# Patient Record
Sex: MALE | Race: WHITE | URBAN - METROPOLITAN AREA
[De-identification: names, ages, dates, MRNs, and addresses within clinical notes are randomized per-mention and may not be internally consistent; named-entity substitution may affect disease eponyms.]

---

## 2024-09-03 ENCOUNTER — OFFICE VISIT (OUTPATIENT)
Dept: URBAN - METROPOLITAN AREA CLINIC 80 | Facility: CLINIC | Age: 34
End: 2024-09-03

## 2024-09-11 ENCOUNTER — LAB OUTSIDE AN ENCOUNTER (OUTPATIENT)
Dept: URBAN - METROPOLITAN AREA CLINIC 80 | Facility: CLINIC | Age: 34
End: 2024-09-11

## 2024-09-11 ENCOUNTER — DASHBOARD ENCOUNTERS (OUTPATIENT)
Age: 34
End: 2024-09-11

## 2024-09-11 ENCOUNTER — OFFICE VISIT (OUTPATIENT)
Dept: URBAN - METROPOLITAN AREA CLINIC 80 | Facility: CLINIC | Age: 34
End: 2024-09-11

## 2024-09-11 ENCOUNTER — OFFICE VISIT (OUTPATIENT)
Dept: URBAN - METROPOLITAN AREA CLINIC 80 | Facility: CLINIC | Age: 34
End: 2024-09-11
Payer: SELF-PAY

## 2024-09-11 VITALS
BODY MASS INDEX: 33.75 KG/M2 | WEIGHT: 210 LBS | TEMPERATURE: 97.4 F | SYSTOLIC BLOOD PRESSURE: 134 MMHG | HEART RATE: 90 BPM | HEIGHT: 66 IN | DIASTOLIC BLOOD PRESSURE: 82 MMHG

## 2024-09-11 DIAGNOSIS — K92.1 BLOOD IN STOOL: ICD-10-CM

## 2024-09-11 DIAGNOSIS — K64.8 INTERNAL HEMORRHOID: ICD-10-CM

## 2024-09-11 DIAGNOSIS — R10.84 ABDOMINAL CRAMPING, GENERALIZED: ICD-10-CM

## 2024-09-11 DIAGNOSIS — R11.0 AM NAUSEA: ICD-10-CM

## 2024-09-11 PROBLEM — 428283002: Status: ACTIVE | Noted: 2024-09-11

## 2024-09-11 PROCEDURE — 99204 OFFICE O/P NEW MOD 45 MIN: CPT | Performed by: INTERNAL MEDICINE

## 2024-09-11 RX ORDER — ONDANSETRON 4 MG/1
1 TABLET ON THE TONGUE AND ALLOW TO DISSOLVE TABLET, ORALLY DISINTEGRATING ORAL
Qty: 30 TABLET | Refills: 0 | OUTPATIENT
Start: 2024-09-11

## 2024-09-11 RX ORDER — ESOMEPRAZOLE MAGNESIUM 40 MG/1
1 CAPSULE CAPSULE, DELAYED RELEASE PELLETS ORAL ONCE A DAY
Status: ACTIVE | COMMUNITY

## 2024-09-11 RX ORDER — AMLODIPINE BESYLATE 5 MG/1
1 TABLET TABLET ORAL TWICE A DAY
Status: ACTIVE | COMMUNITY

## 2024-09-11 RX ORDER — RIFAXIMIN 550 MG/1
1 TABLET TABLET ORAL THREE TIMES A DAY
Qty: 42 TABLET | Refills: 0 | OUTPATIENT
Start: 2024-09-11 | End: 2024-09-25

## 2024-09-11 RX ORDER — HYDROCORTISONE ACETATE 25 MG/1
1 SUPPOSITORY SUPPOSITORY RECTAL ONCE A DAY
Qty: 14 SUPPOSITORY | Refills: 0 | OUTPATIENT
Start: 2024-09-11 | End: 2024-09-25

## 2024-09-11 RX ORDER — HYDROXYZINE HYDROCHLORIDE 25 MG/1
1 TABLET AS NEEDED TABLET, FILM COATED ORAL ONCE A DAY
Status: ACTIVE | COMMUNITY

## 2024-09-11 NOTE — HPI-TODAY'S VISIT:
Patient with a PMHx of HTN and heartburn comes in c/o feeling like his "food is not processing". He notes that he has had digestive issues for about 3 years that has been worsening. Prior to 3 years ago, pt would have a BM a day. Now patient notes he is having a BM 3-4 times per day and bss 6. He has associated bloating all day/every day.   Patient notes that he does have pain in the LUQ and LLQ intermittently that started about 3 years ago. This pain achy and episodes last about 2 days. Last episode was a week ago. Nothing makes it better. Palpation makes this pain worse. Not affected by BM or eating.   Patient notes RUQ x 6 weeks. This pain is intermittent, use to happen once a day, now happening 3-4 times per day. Episodes last about 20 min. Pain is sharp. No history of abdominal surgery. Nothing makes it better or worse. Patient notes the last episode of this sharp pain was a week ago. The pain will radiate up into the mid chest area and even into his neck.   denies fever/chills.   Denies any family history of Crohns or UC. Mother did need to have GB removed.   Denies any family history of GI related cancer.   Pt notes he did have a colonoscopy when he was 16 for rectal bleeding and told he had polyps and internal hemorrhoids. Patient is unsure of when he was told to repeat.   Pt states he has had bleeding only on the tissue, not on in toilet most days x 3 years.   Patient notes that he does have nausea daily x years. denies vomiting. Heartburn is well controlled on esomeprazole.   denies dysphagia. denies melena. weight has been stable.   Patient notes he recently moved to [place] 1 year ago. He went to the hospital for chest pain and SOB in [facility] but they told him that they thought his symptoms were stomach related. Pt did not see a GI specialist in [facility]. Patient notes that he has been seen by a PCP for past episodes of CP and SOB but he states that no one has worked up his heart, and that he was put on anxiety medicine for these symptoms, but he does not take this medication as it makes him tired. pt denies any current symptoms of chest pain or SOB. Denies any family history of cardiac related issues. He states he smoked years ago but stopped when he was still a teenager.     Patient denies blood thinner use, pacemaker/defibrillator, diabetes, kidney disease, and home O2. Denies any heart or lung issues.

## 2024-09-11 NOTE — PHYSICAL EXAM GASTROINTESTINAL
Abdomen , soft, nontender, nondistended , no guarding or rigidity , no masses palpable , normal bowel sounds, negative Mcgraw's sign, negative Rovsing's, negative psoas and obturator signs, negative CVA tenderness bilaterally Liver and Spleen , no hepatosplenomegaly Rectal , deferred

## 2024-09-13 ENCOUNTER — TELEPHONE ENCOUNTER (OUTPATIENT)
Dept: URBAN - METROPOLITAN AREA CLINIC 80 | Facility: CLINIC | Age: 34
End: 2024-09-13

## 2024-09-17 ENCOUNTER — TELEPHONE ENCOUNTER (OUTPATIENT)
Dept: URBAN - METROPOLITAN AREA CLINIC 80 | Facility: CLINIC | Age: 34
End: 2024-09-17

## 2024-09-23 ENCOUNTER — LAB OUTSIDE AN ENCOUNTER (OUTPATIENT)
Dept: URBAN - METROPOLITAN AREA CLINIC 80 | Facility: CLINIC | Age: 34
End: 2024-09-23

## 2024-09-27 LAB
ADENOVIRUS F 40/41: NOT DETECTED
CALPROTECTIN, STOOL - QDX: (no result)
CAMPYLOBACTER: NOT DETECTED
CLOSTRIDIUM DIFFICILE: NOT DETECTED
ENTAMOEBA HISTOLYTICA: NOT DETECTED
ENTEROAGGREGATIVE E.COLI: NOT DETECTED
ENTEROTOXIGENIC E.COLI: NOT DETECTED
ESCHERICHIA COLI O157: NOT DETECTED
GIARDIA LAMBLIA: NOT DETECTED
NOROVIRUS GI/GII: NOT DETECTED
PANCREATICELASTASE ELISA, STOOL: (no result)
ROTAVIRUS A: NOT DETECTED
SALMONELLA SPP.: NOT DETECTED
SHIGA-LIKE TOXIN PRODUCING E.COLI: NOT DETECTED
SHIGELLA SPP. / ENTEROINVASIVE E.COLI: NOT DETECTED
VIBRIO PARAHAEMOLYTICUS: NOT DETECTED
VIBRIO SPP.: NOT DETECTED
YERSINIA ENTEROCOLITICA: NOT DETECTED

## 2024-10-03 ENCOUNTER — OFFICE VISIT (OUTPATIENT)
Dept: URBAN - METROPOLITAN AREA SURGERY CENTER 19 | Facility: SURGERY CENTER | Age: 34
End: 2024-10-03

## 2024-10-17 ENCOUNTER — LAB OUTSIDE AN ENCOUNTER (OUTPATIENT)
Dept: URBAN - METROPOLITAN AREA CLINIC 80 | Facility: CLINIC | Age: 34
End: 2024-10-17

## 2024-10-17 ENCOUNTER — TELEPHONE ENCOUNTER (OUTPATIENT)
Dept: URBAN - METROPOLITAN AREA CLINIC 80 | Facility: CLINIC | Age: 34
End: 2024-10-17

## 2024-10-17 ENCOUNTER — OFFICE VISIT (OUTPATIENT)
Dept: URBAN - METROPOLITAN AREA CLINIC 80 | Facility: CLINIC | Age: 34
End: 2024-10-17
Payer: SELF-PAY

## 2024-10-17 VITALS
HEIGHT: 66 IN | TEMPERATURE: 97.8 F | SYSTOLIC BLOOD PRESSURE: 154 MMHG | WEIGHT: 210 LBS | DIASTOLIC BLOOD PRESSURE: 100 MMHG | BODY MASS INDEX: 33.75 KG/M2 | HEART RATE: 85 BPM

## 2024-10-17 DIAGNOSIS — R12 HEARTBURN: ICD-10-CM

## 2024-10-17 DIAGNOSIS — R11.0 NAUSEA: ICD-10-CM

## 2024-10-17 DIAGNOSIS — Z86.0100 PERSONAL HISTORY OF COLONIC POLYPS: ICD-10-CM

## 2024-10-17 DIAGNOSIS — R19.7 DIARRHEA, UNSPECIFIED TYPE: ICD-10-CM

## 2024-10-17 DIAGNOSIS — K92.1 BLOOD IN STOOL: ICD-10-CM

## 2024-10-17 DIAGNOSIS — R79.89 ELEVATED LFTS: ICD-10-CM

## 2024-10-17 PROCEDURE — 99213 OFFICE O/P EST LOW 20 MIN: CPT

## 2024-10-17 RX ORDER — HYDROXYZINE HYDROCHLORIDE 25 MG/1
1 TABLET AS NEEDED TABLET, FILM COATED ORAL ONCE A DAY
Status: ACTIVE | COMMUNITY

## 2024-10-17 RX ORDER — ONDANSETRON 4 MG/1
1 TABLET ON THE TONGUE AND ALLOW TO DISSOLVE TABLET, ORALLY DISINTEGRATING ORAL
Qty: 30 TABLET | Refills: 0 | Status: ACTIVE | COMMUNITY
Start: 2024-09-11

## 2024-10-17 RX ORDER — HYDROCORTISONE ACETATE 25 MG/1
1 SUPPOSITORY SUPPOSITORY RECTAL ONCE A DAY
Qty: 14 SUPPOSITORY | Refills: 0 | OUTPATIENT
Start: 2024-10-17 | End: 2024-10-31

## 2024-10-17 RX ORDER — AMLODIPINE BESYLATE 5 MG/1
1 TABLET TABLET ORAL TWICE A DAY
Status: ACTIVE | COMMUNITY

## 2024-10-17 RX ORDER — ESOMEPRAZOLE MAGNESIUM 40 MG/1
1 CAPSULE CAPSULE, DELAYED RELEASE PELLETS ORAL ONCE A DAY
Status: ACTIVE | COMMUNITY

## 2024-10-17 NOTE — HPI-TODAY'S VISIT:
9/11/2024: Patient with a PMHx of HTN and heartburn comes in c/o feeling like his "food is not processing". He notes that he has had digestive issues for about 3 years that has been worsening. Prior to 3 years ago, pt would have a BM a day. Now patient notes he is having a BM 3-4 times per day and bss 6. He has associated bloating all day/every day.   Patient notes that he does have pain in the LUQ and LLQ intermittently that started about 3 years ago. This pain achy and episodes last about 2 days. Last episode was a week ago. Nothing makes it better. Palpation makes this pain worse. Not affected by BM or eating.   Patient notes RUQ x 6 weeks. This pain is intermittent, use to happen once a day, now happening 3-4 times per day. Episodes last about 20 min. Pain is sharp. No history of abdominal surgery. Nothing makes it better or worse. Patient notes the last episode of this sharp pain was a week ago. The pain will radiate up into the mid chest area and even into his neck.   denies fever/chills.   Denies any family history of Crohns or UC. Mother did need to have GB removed.   Denies any family history of GI related cancer.   Pt notes he did have a colonoscopy when he was 16 for rectal bleeding and told he had polyps and internal hemorrhoids. Patient is unsure of when he was told to repeat.   Pt states he has had bleeding only on the tissue, not on in toilet most days x 3 years.   Patient notes that he does have nausea daily x years. denies vomiting. Heartburn is well controlled on esomeprazole.   denies dysphagia. denies melena. weight has been stable.  Patient notes he recently moved to [place] 1 year ago. He went to the hospital for chest pain and SOB in [facility] but they told him that they thought his symptoms were stomach related. Pt did not see a GI specialist in [facility]. Patient notes that he has been seen by a PCP for past episodes of CP and SOB but he states that no one has worked up his heart, and that he was put on anxiety medicine for these symptoms, but he does not take this medication as it makes him tired. pt denies any current symptoms of chest pain or SOB. Denies any family history of cardiac related issues. He states he smoked years ago but stopped when he was still a teenager.     Patient denies blood thinner use, pacemaker/defibrillator, diabetes, kidney disease, and home O2. Denies any heart or lung issues.    At the time, labs EGD/colon, sucrose breath test, stool studies were ordered and hydrocortisone suppositories were sent.  EGD and colonoscopy have not yet been completed.  stool studies showed a borderline fecal wilfred, otherwise unremarkable    Today: Patient notes that he was eating better and sticking to the low fodmap diet and decreased the amount of beer he was drinking.   When he was eating better he was having a BM per day and well formed. Now that he is eating more sugar he is having 2 BM a day and they are loose.   He did not take the Xifaxan. He is uninsured and has not completed the PAP application.   Bloating improved with the low fodmap diet. He has not yet completed surcose breath test.  With the improved diet LUQ pain and LLQ pain improved.   Pt has not had an episode of RUQ pain since last visit.   Pt notes the rectal bleeding has improved to once a week, still only on the tissue. He has not yet used the suppositories   nausea has improved with decreased drinking, He has not completed EGD due to cost  heartburn is well controlled on his esomeprazole but if he misses a dose it can be severe.   CT scan 9/18/2024 showed hepatic steatosis, bladder wall thickening, and small bilateral fatty inguinal hernias    Labs showed no serological evidence of celiac    WBC 6.7, hgb 16.5, hct 47.2, alk phos 95, ALT elevated at 249, and , total bili slightly elevated at 1.3, lipase normal at 36.6